# Patient Record
Sex: MALE | Race: WHITE | Employment: FULL TIME | URBAN - METROPOLITAN AREA
[De-identification: names, ages, dates, MRNs, and addresses within clinical notes are randomized per-mention and may not be internally consistent; named-entity substitution may affect disease eponyms.]

---
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
## 2018-01-04 ENCOUNTER — HOSPITAL ENCOUNTER (EMERGENCY)
Facility: HOSPITAL | Age: 18
Discharge: HOME | End: 2018-01-04
Attending: EMERGENCY MEDICINE
Payer: COMMERCIAL

## 2018-01-04 VITALS
WEIGHT: 180 LBS | RESPIRATION RATE: 18 BRPM | DIASTOLIC BLOOD PRESSURE: 70 MMHG | OXYGEN SATURATION: 96 % | BODY MASS INDEX: 25.77 KG/M2 | TEMPERATURE: 99 F | HEIGHT: 70 IN | SYSTOLIC BLOOD PRESSURE: 110 MMHG | HEART RATE: 78 BPM

## 2018-01-04 DIAGNOSIS — S61.411A LACERATION OF RIGHT HAND, FOREIGN BODY PRESENCE UNSPECIFIED, INITIAL ENCOUNTER: Primary | ICD-10-CM

## 2018-01-04 PROCEDURE — 99283 EMERGENCY DEPT VISIT LOW MDM: CPT

## 2018-01-04 PROCEDURE — 90471 IMMUNIZATION ADMIN: CPT

## 2018-01-04 RX ORDER — MINOCYCLINE HYDROCHLORIDE 50 MG/1
50 CAPSULE ORAL 2 TIMES DAILY
COMMUNITY
End: 2018-06-20 | Stop reason: ALTCHOICE

## 2018-01-04 NOTE — ED PROVIDER NOTES
Patient Seen in: [facility] Emergency Department    History   Patient presents with:  Laceration Abrasion (integumentary)    Stated Complaint: lac to finger    HPI    Patient is a 52-year-old male who slapped a chandelier 1 hour ago sustained [place] irrigated. Tetanus updated. No foreign body seen. Steri-Strips applied.   He will be discharged home with PCP follow-up        Disposition and Plan     Clinical Impression:  Laceration of right hand, foreign body presence unspecified, initial encounter

## 2018-06-20 ENCOUNTER — HOSPITAL ENCOUNTER (EMERGENCY)
Facility: HOSPITAL | Age: 18
Discharge: HOME | End: 2018-06-20
Attending: EMERGENCY MEDICINE

## 2018-06-20 VITALS
BODY MASS INDEX: 27 KG/M2 | RESPIRATION RATE: 24 BRPM | OXYGEN SATURATION: 98 % | DIASTOLIC BLOOD PRESSURE: 76 MMHG | SYSTOLIC BLOOD PRESSURE: 131 MMHG | TEMPERATURE: 98 F | WEIGHT: 187.81 LBS | HEART RATE: 72 BPM

## 2018-06-20 DIAGNOSIS — S61.213A LACERATION OF LEFT MIDDLE FINGER WITHOUT FOREIGN BODY WITHOUT DAMAGE TO NAIL, INITIAL ENCOUNTER: Primary | ICD-10-CM

## 2018-06-20 PROCEDURE — 99283 EMERGENCY DEPT VISIT LOW MDM: CPT

## 2018-06-20 PROCEDURE — 12002 RPR S/N/AX/GEN/TRNK2.6-7.5CM: CPT

## 2018-06-20 PROCEDURE — 99282 EMERGENCY DEPT VISIT SF MDM: CPT

## 2018-06-20 NOTE — ED INITIAL ASSESSMENT (HPI)
Pt here for evaluation of laceration to left 3rd finger. Pt was cutting an ice cube last night with a knife and cut his finger ~2/3am  No recent illness. On exam, pt well appearing, easy WOB.  Lungs clear A/P bilaterally  +curved 1.5cm laceration to top

## 2018-06-20 NOTE — ED PROVIDER NOTES
Patient Seen in: [facility] Emergency Department    History   Patient presents with:  Laceration Abrasion (integumentary)    Stated Complaint: lac    HPI    This is a 20-year-old boy complaining of a laceration to his left third finger that happened intact. SKIN: Well perfused, without cyanosis. No rashes. NEUROLOGIC: Cranial nerves II through XII are intact moving all extremities normally. No focal deficits visualized.     ED Course   Labs Reviewed - No data to display    ED Course as of Jun 20 14

## 2019-08-07 PROBLEM — F90.9 ATTENTION DEFICIT DISORDER OF ADULT WITH HYPERACTIVITY: Status: ACTIVE | Noted: 2019-08-07

## 2019-12-19 PROBLEM — F90.9 ATTENTION DEFICIT HYPERACTIVITY DISORDER (ADHD), UNSPECIFIED ADHD TYPE: Status: ACTIVE | Noted: 2019-12-19

## 2020-02-18 PROCEDURE — 99284 EMERGENCY DEPT VISIT MOD MDM: CPT | Performed by: EMERGENCY MEDICINE

## 2020-02-18 PROCEDURE — 93010 ELECTROCARDIOGRAM REPORT: CPT | Performed by: INTERNAL MEDICINE

## 2020-10-08 PROBLEM — F90.9 ATTENTION DEFICIT HYPERACTIVITY DISORDER (ADHD), UNSPECIFIED ADHD TYPE: Status: RESOLVED | Noted: 2019-12-19 | Resolved: 2020-10-08

## 2020-10-08 PROBLEM — S61.319D: Status: RESOLVED | Noted: 2017-03-14 | Resolved: 2020-10-08

## 2020-10-08 PROBLEM — S62.666D: Status: RESOLVED | Noted: 2017-02-28 | Resolved: 2020-10-08

## 2020-10-08 PROBLEM — S62.666B: Status: RESOLVED | Noted: 2017-02-20 | Resolved: 2020-10-08

## 2022-08-30 ENCOUNTER — APPOINTMENT (OUTPATIENT)
Dept: CT IMAGING | Age: 22
End: 2022-08-30
Attending: EMERGENCY MEDICINE

## 2022-08-30 PROCEDURE — 99283 EMERGENCY DEPT VISIT LOW MDM: CPT

## 2022-08-30 PROCEDURE — G1004 CDSM NDSC: HCPCS

## 2022-08-30 PROCEDURE — 72125 CT NECK SPINE W/O DYE: CPT

## 2022-08-30 PROCEDURE — 70450 CT HEAD/BRAIN W/O DYE: CPT

## 2022-08-30 ASSESSMENT — PAIN SCALES - GENERAL: PAINLEVEL_OUTOF10: 3

## 2022-08-31 ENCOUNTER — HOSPITAL ENCOUNTER (EMERGENCY)
Age: 22
Discharge: HOME | End: 2022-08-31
Attending: STUDENT IN AN ORGANIZED HEALTH CARE EDUCATION/TRAINING PROGRAM

## 2022-08-31 VITALS
OXYGEN SATURATION: 97 % | WEIGHT: 212.96 LBS | SYSTOLIC BLOOD PRESSURE: 127 MMHG | DIASTOLIC BLOOD PRESSURE: 80 MMHG | TEMPERATURE: 98.8 F | HEART RATE: 72 BPM | RESPIRATION RATE: 16 BRPM

## 2022-08-31 DIAGNOSIS — S06.0X0A CONCUSSION WITHOUT LOSS OF CONSCIOUSNESS, INITIAL ENCOUNTER: ICD-10-CM

## 2022-08-31 DIAGNOSIS — S09.90XA CLOSED HEAD INJURY, INITIAL ENCOUNTER: Primary | ICD-10-CM

## 2022-08-31 PROCEDURE — 10004651 HB RX, NO CHARGE ITEM: Performed by: STUDENT IN AN ORGANIZED HEALTH CARE EDUCATION/TRAINING PROGRAM

## 2022-08-31 PROCEDURE — 99283 EMERGENCY DEPT VISIT LOW MDM: CPT | Performed by: STUDENT IN AN ORGANIZED HEALTH CARE EDUCATION/TRAINING PROGRAM

## 2022-08-31 RX ORDER — ACETAMINOPHEN 500 MG
1000 TABLET ORAL ONCE
Status: DISCONTINUED | OUTPATIENT
Start: 2022-08-31 | End: 2022-08-31

## 2022-08-31 RX ADMIN — ACETAMINOPHEN 1000 MG: 500 TABLET ORAL at 02:00

## (undated) NOTE — ED AVS SNAPSHOT
[facility] Emergency Department    [place]  [address] [address]    Phone:  [phone]    Fax:  [phone] [address]   MRN: [id number]    Department:  [facility] Emergency Department   Date of Visit:  2/15/201 IF THERE IS ANY CHANGE OR WORSENING OF YOUR CONDITION, CALL YOUR PRIMARY CARE PHYSICIAN AT ONCE OR RETURN IMMEDIATELY TO THE EMERGENCY DEPARTMENT.     If you have been prescribed any medication(s), please fill your prescription right away and begin taking t

## (undated) NOTE — LETTER
February 15, 2017    Patient: Keenan Heading   Date of Visit: 2/15/2017       To Whom It May Concern:    Jose Mayberry was seen and treated in our emergency department on 2/15/2017.  He may return to work with use of his right hand or getting it wet until pete

## (undated) NOTE — ED AVS SNAPSHOT
Aileenannette Pearce   MRN: [id number]    Department:  [facility] Emergency Department   Date of Visit:  6/20/2018           Disclosure     Insurance plans vary and the physician(s) referred by the ER may not be covered by your plan.  Please contact your in tell this physician (or your personal doctor if your instructions are to return to your personal doctor) about any new or lasting problems. The primary care or specialist physician will see patients referred from the [facility] Emergency Department.  Santosh Martinez

## (undated) NOTE — LETTER
February 15, 2017    Patient: Mony Banda   Date of Visit: 2/15/2017       To Whom It May Concern:    Danny Mccoy was seen and treated in our emergency department on 2/15/2017.  He may return to school with no contact sports or gym until cleared by [employer]

## (undated) NOTE — ED AVS SNAPSHOT
[facility] Emergency Department    [facility]  [address] [address]    Phone:  [phone]    Fax:  [phone] [address]   MRN: [id number]    Department:  [facility] Emergency Department   Date of Visit:  2/15/201 Keep dressing in place. Clean with soap and water 2X per day once dressing is removed. Apply bacitracin 2X per day. Keep area clean and dry. Return for signs of infection or any concerns.         Discharge References/Attachments     FRACTURE, FINGER [facility] Emergency Department. Follow-up care is at the discretion of that Physician. IF THERE IS ANY CHANGE OR WORSENING OF YOUR CONDITION, CALL YOUR PRIMARY CARE PHYSICIAN AT ONCE OR RETURN IMMEDIATELY TO THE EMERGENCY DEPARTMENT.     If you hav harming yourself, contact [facility] at [phone]. - If you don’t have insurance, Zoe Tapia has partnered with [facility] to help you get signed up for insurance coverage.   [facility]

## (undated) NOTE — ED AVS SNAPSHOT
Mony Veronika   MRN: [id number]    Department:  [facility] Emergency Department   Date of Visit:  1/4/2018           Disclosure     Insurance plans vary and the physician(s) referred by the ER may not be covered by your plan.  Please contact your ins tell this physician (or your personal doctor if your instructions are to return to your personal doctor) about any new or lasting problems. The primary care or specialist physician will see patients referred from the [facility] Emergency Department.  Golden Manuel

## (undated) NOTE — LETTER
February 15, 2017    Patient: Norberto Vargas   Date of Visit: 2/15/2017       To Whom It May Concern:    Mleida Farrar was seen and treated in our emergency department on 2/15/2017. Please excuse him from work until cleared by orthopedics.     If you have any